# Patient Record
Sex: FEMALE | Race: WHITE
[De-identification: names, ages, dates, MRNs, and addresses within clinical notes are randomized per-mention and may not be internally consistent; named-entity substitution may affect disease eponyms.]

---

## 2023-05-19 ENCOUNTER — APPOINTMENT (OUTPATIENT)
Dept: ORTHOPEDIC SURGERY | Facility: CLINIC | Age: 38
End: 2023-05-19
Payer: COMMERCIAL

## 2023-05-19 VITALS — BODY MASS INDEX: 24.29 KG/M2 | HEIGHT: 62 IN | WEIGHT: 132 LBS

## 2023-05-19 DIAGNOSIS — S63.501A UNSPECIFIED SPRAIN OF RIGHT WRIST, INITIAL ENCOUNTER: ICD-10-CM

## 2023-05-19 DIAGNOSIS — Z78.9 OTHER SPECIFIED HEALTH STATUS: ICD-10-CM

## 2023-05-19 PROBLEM — Z00.00 ENCOUNTER FOR PREVENTIVE HEALTH EXAMINATION: Status: ACTIVE | Noted: 2023-05-19

## 2023-05-19 PROCEDURE — 99203 OFFICE O/P NEW LOW 30 MIN: CPT

## 2023-05-19 PROCEDURE — 73130 X-RAY EXAM OF HAND: CPT | Mod: RT

## 2023-05-19 NOTE — PHYSICAL EXAM
[Normal Mood and Affect] : normal mood and affect [Able to Communicate] : able to communicate [Well Developed] : well developed [Well Nourished] : well nourished [NL (75)] : Dorsiflexion 75 degrees [NL (85)] : volarflexion 85 degrees [NL (90)] : supination 90 degrees [Right] : right wrist [There are no fractures, subluxations or dislocations. No significant abnormalities are seen] : There are no fractures, subluxations or dislocations. No significant abnormalities are seen [de-identified] : average [] : no tenderness over hand

## 2023-05-19 NOTE — HISTORY OF PRESENT ILLNESS
[Gradual] : gradual [8] : 8 [6] : 6 [Dull/Aching] : dull/aching [Ice] : ice [de-identified] : 5/19/23  Initial visit for this 37 year female RHD complaining of spontaneous onset of rt wrist pain after swinging a bat x last 3 weeks duration. Got worse x last few weeks. had difficulty gripping and opening a jar. Bought wrist splint x last 2 days which has helped.\par \par PMH: had some CTS during pregnancy in rt wrist. [] : no [FreeTextEntry1] : R hand/ thumb/ wrist  [FreeTextEntry3] : few month  [FreeTextEntry5] : she is having pain, swelling and cant , no injury [FreeTextEntry9] : brace [de-identified] : activity